# Patient Record
Sex: FEMALE | Race: WHITE | HISPANIC OR LATINO
[De-identification: names, ages, dates, MRNs, and addresses within clinical notes are randomized per-mention and may not be internally consistent; named-entity substitution may affect disease eponyms.]

---

## 2018-04-25 ENCOUNTER — LAB SERVICES (OUTPATIENT)
Dept: OTHER | Age: 38
End: 2018-04-25

## 2018-04-25 ENCOUNTER — CHARTING TRANS (OUTPATIENT)
Dept: OTHER | Age: 38
End: 2018-04-25

## 2018-04-25 LAB — RAPID STREP GROUP A: NORMAL

## 2018-11-01 VITALS
RESPIRATION RATE: 14 BRPM | BODY MASS INDEX: 29.88 KG/M2 | HEIGHT: 64 IN | SYSTOLIC BLOOD PRESSURE: 120 MMHG | WEIGHT: 175 LBS | DIASTOLIC BLOOD PRESSURE: 84 MMHG | TEMPERATURE: 99.2 F | HEART RATE: 70 BPM

## 2019-11-06 ENCOUNTER — WALK IN (OUTPATIENT)
Dept: URGENT CARE | Age: 39
End: 2019-11-06

## 2019-11-06 DIAGNOSIS — J10.1 INFLUENZA A: Primary | ICD-10-CM

## 2019-11-06 PROBLEM — F41.9 ANXIETY: Status: ACTIVE | Noted: 2018-04-25

## 2019-11-06 LAB
FLUAV AG UPPER RESP QL IA.RAPID: POSITIVE
FLUBV AG UPPER RESP QL IA.RAPID: NEGATIVE
INTERNAL PROCEDURAL CONTROLS ACCEPTABLE: YES
S PYO AG THROAT QL IA.RAPID: NEGATIVE

## 2019-11-06 PROCEDURE — 87804 INFLUENZA ASSAY W/OPTIC: CPT | Performed by: NURSE PRACTITIONER

## 2019-11-06 PROCEDURE — 99214 OFFICE O/P EST MOD 30 MIN: CPT | Performed by: NURSE PRACTITIONER

## 2019-11-06 PROCEDURE — 87880 STREP A ASSAY W/OPTIC: CPT | Performed by: NURSE PRACTITIONER

## 2019-11-06 RX ORDER — DULOXETIN HYDROCHLORIDE 20 MG/1
CAPSULE, DELAYED RELEASE ORAL
COMMUNITY
Start: 2019-11-04

## 2019-11-06 RX ORDER — OSELTAMIVIR PHOSPHATE 75 MG/1
75 CAPSULE ORAL 2 TIMES DAILY
Qty: 14 CAPSULE | Refills: 0 | Status: SHIPPED | OUTPATIENT
Start: 2019-11-06 | End: 2019-11-13

## 2019-11-06 ASSESSMENT — ENCOUNTER SYMPTOMS
RHINORRHEA: 1
DIZZINESS: 0
DIAPHORESIS: 0
COUGH: 1
CONSTIPATION: 0
FATIGUE: 1
EYES NEGATIVE: 1
SORE THROAT: 1
WEAKNESS: 0
VOMITING: 0
FEVER: 0
SINUS PRESSURE: 0
HEADACHES: 0
SINUS PAIN: 0
BACK PAIN: 0
NAUSEA: 0
APPETITE CHANGE: 1
ABDOMINAL PAIN: 1
SHORTNESS OF BREATH: 1
CHEST TIGHTNESS: 0
WHEEZING: 0
TROUBLE SWALLOWING: 0
DIARRHEA: 0
CHILLS: 0

## 2019-11-06 ASSESSMENT — PAIN SCALES - GENERAL: PAINLEVEL: 7-8

## 2021-05-26 VITALS
TEMPERATURE: 98.1 F | SYSTOLIC BLOOD PRESSURE: 130 MMHG | BODY MASS INDEX: 31.58 KG/M2 | HEART RATE: 77 BPM | WEIGHT: 185 LBS | HEIGHT: 64 IN | DIASTOLIC BLOOD PRESSURE: 80 MMHG | OXYGEN SATURATION: 97 % | RESPIRATION RATE: 16 BRPM

## 2022-06-23 ENCOUNTER — OFFICE VISIT (OUTPATIENT)
Dept: OBGYN | Age: 42
End: 2022-06-23

## 2022-06-23 VITALS
HEIGHT: 63 IN | HEART RATE: 71 BPM | SYSTOLIC BLOOD PRESSURE: 130 MMHG | TEMPERATURE: 97.2 F | RESPIRATION RATE: 18 BRPM | DIASTOLIC BLOOD PRESSURE: 83 MMHG | WEIGHT: 186.7 LBS | BODY MASS INDEX: 33.08 KG/M2

## 2022-06-23 DIAGNOSIS — D21.9 FIBROID: ICD-10-CM

## 2022-06-23 DIAGNOSIS — N83.209 CYST OF OVARY, UNSPECIFIED LATERALITY: ICD-10-CM

## 2022-06-23 DIAGNOSIS — Z80.9 FAMILY HISTORY OF CANCER: Primary | ICD-10-CM

## 2022-06-23 PROCEDURE — 99203 OFFICE O/P NEW LOW 30 MIN: CPT | Performed by: OBSTETRICS & GYNECOLOGY

## 2022-06-23 ASSESSMENT — PAIN SCALES - GENERAL: PAINLEVEL: 0

## 2022-09-22 ENCOUNTER — OFFICE VISIT (OUTPATIENT)
Dept: MATERNAL FETAL MEDICINE | Age: 42
End: 2022-09-22
Attending: OBSTETRICS & GYNECOLOGY

## 2022-09-22 DIAGNOSIS — N92.1 MENORRHAGIA WITH IRREGULAR CYCLE: Primary | ICD-10-CM

## 2022-09-22 DIAGNOSIS — D25.1 INTRAMURAL LEIOMYOMA OF UTERUS: ICD-10-CM

## 2022-09-22 DIAGNOSIS — N83.202 CYST OF LEFT OVARY: ICD-10-CM

## 2022-09-22 DIAGNOSIS — N92.0 MENORRHAGIA WITH REGULAR CYCLE: ICD-10-CM

## 2022-09-22 PROBLEM — D25.9 UTERINE MYOMA: Status: ACTIVE | Noted: 2022-09-22

## 2022-09-22 PROCEDURE — 76830 TRANSVAGINAL US NON-OB: CPT | Performed by: OBSTETRICS & GYNECOLOGY

## 2022-11-02 ENCOUNTER — TELEPHONE (OUTPATIENT)
Dept: OBGYN | Age: 42
End: 2022-11-02

## 2023-08-15 DIAGNOSIS — K21.9 GASTROESOPHAGEAL REFLUX DISEASE, UNSPECIFIED WHETHER ESOPHAGITIS PRESENT: ICD-10-CM

## 2023-08-15 PROBLEM — R10.33 PERIUMBILICAL ABDOMINAL PAIN: Status: ACTIVE | Noted: 2023-08-15

## 2023-08-15 PROBLEM — J30.9 ALLERGIC RHINITIS: Status: ACTIVE | Noted: 2023-08-15

## 2023-08-15 PROBLEM — M17.12 PRIMARY OSTEOARTHRITIS OF LEFT KNEE: Status: ACTIVE | Noted: 2023-08-15

## 2023-08-15 PROBLEM — R10.31 RIGHT LOWER QUADRANT ABDOMINAL PAIN: Status: ACTIVE | Noted: 2023-08-15

## 2023-08-15 PROBLEM — R50.9 FEVER: Status: ACTIVE | Noted: 2023-08-15

## 2023-08-15 PROBLEM — K59.00 CONSTIPATION: Status: ACTIVE | Noted: 2023-08-15

## 2023-08-15 PROBLEM — E55.9 VITAMIN D DEFICIENCY: Status: ACTIVE | Noted: 2023-08-15

## 2023-08-15 PROBLEM — R13.10 DYSPHAGIA: Status: ACTIVE | Noted: 2023-08-15

## 2023-08-15 PROBLEM — M12.9 ARTHROPATHY: Status: ACTIVE | Noted: 2023-08-15

## 2023-08-15 PROBLEM — R05.9 COUGH: Status: ACTIVE | Noted: 2023-08-15

## 2023-08-15 PROBLEM — R14.0 ABDOMINAL BLOATING: Status: ACTIVE | Noted: 2023-08-15

## 2023-08-15 PROBLEM — R53.83 FATIGUE: Status: ACTIVE | Noted: 2023-08-15

## 2023-08-15 PROBLEM — M25.50 ARTHRALGIA: Status: ACTIVE | Noted: 2023-08-15

## 2023-08-15 PROBLEM — K62.5 RECTAL BLEEDING: Status: ACTIVE | Noted: 2023-08-15

## 2023-08-15 PROBLEM — R50.84 FEBRILE NONHEMOLYTIC TRANSFUSION REACTION: Status: ACTIVE | Noted: 2023-08-15

## 2023-08-15 PROBLEM — E83.10 IRON DISORDER: Status: ACTIVE | Noted: 2023-08-15

## 2023-08-15 PROBLEM — R76.8 POSITIVE ANA (ANTINUCLEAR ANTIBODY): Status: ACTIVE | Noted: 2023-08-15

## 2023-08-15 PROBLEM — J01.90 SINUSITIS, ACUTE: Status: ACTIVE | Noted: 2023-08-15

## 2023-08-15 PROBLEM — J45.909 RAD (REACTIVE AIRWAY DISEASE) (HHS-HCC): Status: ACTIVE | Noted: 2023-08-15

## 2023-08-15 RX ORDER — OMEPRAZOLE 20 MG/1
20 CAPSULE, DELAYED RELEASE ORAL DAILY
COMMUNITY
Start: 2023-07-12

## 2023-08-15 RX ORDER — OMEPRAZOLE 20 MG/1
20 CAPSULE, DELAYED RELEASE ORAL DAILY
Qty: 90 CAPSULE | Refills: 0 | Status: SHIPPED | OUTPATIENT
Start: 2023-08-15

## 2023-08-16 ENCOUNTER — TELEPHONE (OUTPATIENT)
Dept: PRIMARY CARE | Facility: CLINIC | Age: 43
End: 2023-08-16

## 2023-08-16 NOTE — TELEPHONE ENCOUNTER
----- Message from Natalio Gill sent at 8/15/2023  1:48 PM EDT -----  Left voice mail to call back and schedule appt  ----- Message -----  From: Andriy Moseley CMA  Sent: 8/15/2023  12:27 PM EDT  To: Natalio Gill    Due for appointment, last seen a year ago, requesting RF

## 2023-09-05 DIAGNOSIS — Z12.31 ENCOUNTER FOR SCREENING MAMMOGRAM FOR MALIGNANT NEOPLASM OF BREAST: Primary | ICD-10-CM

## 2023-09-06 ENCOUNTER — TELEPHONE (OUTPATIENT)
Dept: PRIMARY CARE | Facility: CLINIC | Age: 43
End: 2023-09-06

## 2023-09-08 ENCOUNTER — TELEPHONE (OUTPATIENT)
Dept: PRIMARY CARE | Facility: CLINIC | Age: 43
End: 2023-09-08

## 2024-07-09 ENCOUNTER — TELEPHONE (OUTPATIENT)
Dept: PRIMARY CARE | Facility: CLINIC | Age: 44
End: 2024-07-09

## 2024-07-09 ENCOUNTER — APPOINTMENT (OUTPATIENT)
Dept: PRIMARY CARE | Facility: CLINIC | Age: 44
End: 2024-07-09
Payer: COMMERCIAL

## 2024-07-09 ENCOUNTER — TELEPHONE (OUTPATIENT)
Dept: SCHEDULING | Age: 44
End: 2024-07-09

## 2024-07-09 ENCOUNTER — LAB (OUTPATIENT)
Dept: LAB | Facility: LAB | Age: 44
End: 2024-07-09
Payer: COMMERCIAL

## 2024-07-09 VITALS
OXYGEN SATURATION: 98 % | SYSTOLIC BLOOD PRESSURE: 120 MMHG | WEIGHT: 220 LBS | HEART RATE: 78 BPM | TEMPERATURE: 98.2 F | BODY MASS INDEX: 40.48 KG/M2 | RESPIRATION RATE: 14 BRPM | HEIGHT: 62 IN | DIASTOLIC BLOOD PRESSURE: 88 MMHG

## 2024-07-09 DIAGNOSIS — E55.9 VITAMIN D DEFICIENCY: ICD-10-CM

## 2024-07-09 DIAGNOSIS — R63.5 WEIGHT GAIN: ICD-10-CM

## 2024-07-09 DIAGNOSIS — Z12.31 ENCOUNTER FOR SCREENING MAMMOGRAM FOR MALIGNANT NEOPLASM OF BREAST: ICD-10-CM

## 2024-07-09 DIAGNOSIS — Z12.11 COLON CANCER SCREENING: ICD-10-CM

## 2024-07-09 DIAGNOSIS — Z84.81 FAMILY HISTORY OF BRCA GENE MUTATION: ICD-10-CM

## 2024-07-09 DIAGNOSIS — J45.902 REACTIVE AIRWAY DISEASE WITH STATUS ASTHMATICUS, UNSPECIFIED ASTHMA SEVERITY, UNSPECIFIED WHETHER PERSISTENT (HHS-HCC): ICD-10-CM

## 2024-07-09 DIAGNOSIS — M25.50 ARTHRALGIA, UNSPECIFIED JOINT: ICD-10-CM

## 2024-07-09 DIAGNOSIS — M12.9 ARTHROPATHY: ICD-10-CM

## 2024-07-09 DIAGNOSIS — Z23 NEED FOR TETANUS BOOSTER: ICD-10-CM

## 2024-07-09 DIAGNOSIS — M25.50 ARTHRALGIA, UNSPECIFIED JOINT: Primary | ICD-10-CM

## 2024-07-09 DIAGNOSIS — K21.00 GASTROESOPHAGEAL REFLUX DISEASE WITH ESOPHAGITIS WITHOUT HEMORRHAGE: ICD-10-CM

## 2024-07-09 PROBLEM — R13.10 DYSPHAGIA: Status: RESOLVED | Noted: 2023-08-15 | Resolved: 2024-07-09

## 2024-07-09 PROBLEM — M17.12 PRIMARY OSTEOARTHRITIS OF LEFT KNEE: Status: RESOLVED | Noted: 2023-08-15 | Resolved: 2024-07-09

## 2024-07-09 PROBLEM — K21.9 GERD (GASTROESOPHAGEAL REFLUX DISEASE): Status: RESOLVED | Noted: 2023-08-15 | Resolved: 2024-07-09

## 2024-07-09 PROBLEM — J45.909 RAD (REACTIVE AIRWAY DISEASE) (HHS-HCC): Status: RESOLVED | Noted: 2023-08-15 | Resolved: 2024-07-09

## 2024-07-09 PROBLEM — R10.33 PERIUMBILICAL ABDOMINAL PAIN: Status: RESOLVED | Noted: 2023-08-15 | Resolved: 2024-07-09

## 2024-07-09 PROBLEM — R50.9 FEVER: Status: RESOLVED | Noted: 2023-08-15 | Resolved: 2024-07-09

## 2024-07-09 PROBLEM — R14.0 ABDOMINAL BLOATING: Status: RESOLVED | Noted: 2023-08-15 | Resolved: 2024-07-09

## 2024-07-09 PROBLEM — R50.84 FEBRILE NONHEMOLYTIC TRANSFUSION REACTION: Status: RESOLVED | Noted: 2023-08-15 | Resolved: 2024-07-09

## 2024-07-09 PROBLEM — R53.83 FATIGUE: Status: RESOLVED | Noted: 2023-08-15 | Resolved: 2024-07-09

## 2024-07-09 PROBLEM — J30.9 ALLERGIC RHINITIS: Status: RESOLVED | Noted: 2023-08-15 | Resolved: 2024-07-09

## 2024-07-09 PROBLEM — K62.5 RECTAL BLEEDING: Status: RESOLVED | Noted: 2023-08-15 | Resolved: 2024-07-09

## 2024-07-09 PROBLEM — J01.90 SINUSITIS, ACUTE: Status: RESOLVED | Noted: 2023-08-15 | Resolved: 2024-07-09

## 2024-07-09 PROBLEM — K59.00 CONSTIPATION: Status: RESOLVED | Noted: 2023-08-15 | Resolved: 2024-07-09

## 2024-07-09 PROBLEM — R05.9 COUGH: Status: RESOLVED | Noted: 2023-08-15 | Resolved: 2024-07-09

## 2024-07-09 PROBLEM — R10.31 RIGHT LOWER QUADRANT ABDOMINAL PAIN: Status: RESOLVED | Noted: 2023-08-15 | Resolved: 2024-07-09

## 2024-07-09 LAB
25(OH)D3 SERPL-MCNC: 50 NG/ML (ref 30–100)
ANION GAP SERPL CALC-SCNC: 13 MMOL/L (ref 10–20)
BUN SERPL-MCNC: 20 MG/DL (ref 6–23)
CALCIUM SERPL-MCNC: 9 MG/DL (ref 8.6–10.3)
CHLORIDE SERPL-SCNC: 105 MMOL/L (ref 98–107)
CO2 SERPL-SCNC: 23 MMOL/L (ref 21–32)
CREAT SERPL-MCNC: 0.82 MG/DL (ref 0.5–1.05)
CRP SERPL-MCNC: 0.13 MG/DL
EGFRCR SERPLBLD CKD-EPI 2021: 89 ML/MIN/1.73M*2
ERYTHROCYTE [DISTWIDTH] IN BLOOD BY AUTOMATED COUNT: 13.7 % (ref 11.5–14.5)
ERYTHROCYTE [SEDIMENTATION RATE] IN BLOOD BY WESTERGREN METHOD: 12 MM/H (ref 0–20)
FERRITIN SERPL-MCNC: 44 NG/ML (ref 8–150)
GLUCOSE SERPL-MCNC: 95 MG/DL (ref 74–99)
HCT VFR BLD AUTO: 40.4 % (ref 36–46)
HGB BLD-MCNC: 13.2 G/DL (ref 12–16)
IRON SATN MFR SERPL: 17 % (ref 25–45)
IRON SERPL-MCNC: 82 UG/DL (ref 35–150)
MCH RBC QN AUTO: 29.7 PG (ref 26–34)
MCHC RBC AUTO-ENTMCNC: 32.7 G/DL (ref 32–36)
MCV RBC AUTO: 91 FL (ref 80–100)
NRBC BLD-RTO: 0 /100 WBCS (ref 0–0)
PLATELET # BLD AUTO: 229 X10*3/UL (ref 150–450)
POTASSIUM SERPL-SCNC: 4.4 MMOL/L (ref 3.5–5.3)
RBC # BLD AUTO: 4.44 X10*6/UL (ref 4–5.2)
SODIUM SERPL-SCNC: 137 MMOL/L (ref 136–145)
TIBC SERPL-MCNC: 493 UG/DL (ref 240–445)
TSH SERPL-ACNC: 2.62 MIU/L (ref 0.44–3.98)
UIBC SERPL-MCNC: 411 UG/DL (ref 110–370)
VIT B12 SERPL-MCNC: 395 PG/ML (ref 211–911)
WBC # BLD AUTO: 9 X10*3/UL (ref 4.4–11.3)

## 2024-07-09 PROCEDURE — 99214 OFFICE O/P EST MOD 30 MIN: CPT | Performed by: INTERNAL MEDICINE

## 2024-07-09 PROCEDURE — 86235 NUCLEAR ANTIGEN ANTIBODY: CPT

## 2024-07-09 PROCEDURE — 83540 ASSAY OF IRON: CPT

## 2024-07-09 PROCEDURE — 80048 BASIC METABOLIC PNL TOTAL CA: CPT

## 2024-07-09 PROCEDURE — 82728 ASSAY OF FERRITIN: CPT

## 2024-07-09 PROCEDURE — 85027 COMPLETE CBC AUTOMATED: CPT

## 2024-07-09 PROCEDURE — 86038 ANTINUCLEAR ANTIBODIES: CPT

## 2024-07-09 PROCEDURE — 86431 RHEUMATOID FACTOR QUANT: CPT

## 2024-07-09 PROCEDURE — 86140 C-REACTIVE PROTEIN: CPT

## 2024-07-09 PROCEDURE — 36415 COLL VENOUS BLD VENIPUNCTURE: CPT

## 2024-07-09 PROCEDURE — 82306 VITAMIN D 25 HYDROXY: CPT

## 2024-07-09 PROCEDURE — 84443 ASSAY THYROID STIM HORMONE: CPT

## 2024-07-09 PROCEDURE — 1036F TOBACCO NON-USER: CPT | Performed by: INTERNAL MEDICINE

## 2024-07-09 PROCEDURE — 83550 IRON BINDING TEST: CPT

## 2024-07-09 PROCEDURE — 82607 VITAMIN B-12: CPT

## 2024-07-09 PROCEDURE — 86200 CCP ANTIBODY: CPT

## 2024-07-09 PROCEDURE — 85652 RBC SED RATE AUTOMATED: CPT

## 2024-07-09 PROCEDURE — 86225 DNA ANTIBODY NATIVE: CPT

## 2024-07-09 RX ORDER — DEXTROAMPHETAMINE SACCHARATE, AMPHETAMINE ASPARTATE, DEXTROAMPHETAMINE SULFATE AND AMPHETAMINE SULFATE 3.75; 3.75; 3.75; 3.75 MG/1; MG/1; MG/1; MG/1
15 TABLET ORAL 2 TIMES DAILY
COMMUNITY
Start: 2024-07-02 | End: 2024-09-30

## 2024-07-09 RX ORDER — ACETAMINOPHEN 500 MG
1 TABLET ORAL DAILY
COMMUNITY
Start: 2022-03-29

## 2024-07-09 RX ORDER — NORETHINDRONE ACETATE AND ETHINYL ESTRADIOL, AND FERROUS FUMARATE 1MG-20(24)
1 KIT ORAL DAILY
COMMUNITY

## 2024-07-09 ASSESSMENT — PATIENT HEALTH QUESTIONNAIRE - PHQ9
1. LITTLE INTEREST OR PLEASURE IN DOING THINGS: NOT AT ALL
SUM OF ALL RESPONSES TO PHQ9 QUESTIONS 1 AND 2: 0
2. FEELING DOWN, DEPRESSED OR HOPELESS: NOT AT ALL

## 2024-07-09 NOTE — PROGRESS NOTES
"Subjective    Mar Nazario is a 44 y.o. female who presents for Joint Pain.  HPI    C/o joint pain throughout body but hands are the worst.  Decreased range of motion in thumbs  Hands back,hip knees,   Has had joint pain for years but worsening over the last 3 months  She has a rash on her face. She has had for a few years.   She has been on birth control.      Sister diagnosed with breast cancer her was dx at 40 with BRCA gene       Review of Systems   All other systems reviewed and are negative.        Objective     /88 (BP Location: Left arm, Patient Position: Sitting, BP Cuff Size: Adult)   Pulse 78   Temp 36.8 °C (98.2 °F) (Skin)   Resp 14   Ht 1.575 m (5' 2\")   Wt 99.8 kg (220 lb)   SpO2 98%   BMI 40.24 kg/m²    Physical Exam  Vitals reviewed.   Constitutional:       General: She is not in acute distress.     Appearance: Normal appearance.   Cardiovascular:      Rate and Rhythm: Normal rate and regular rhythm.      Pulses: Normal pulses.      Heart sounds: Normal heart sounds.   Pulmonary:      Effort: Pulmonary effort is normal.      Breath sounds: Normal breath sounds.   Abdominal:      Tenderness: There is no abdominal tenderness.   Musculoskeletal:         General: Swelling present.   Skin:     General: Skin is warm and dry.   Neurological:      Mental Status: She is alert.       Health Maintenance Due   Topic Date Due    Yearly Adult Physical  Never done    Lipid Panel  Never done    HIV Screening  Never done    MMR Vaccines (1 of 1 - Standard series) Never done    Hepatitis C Screening  Never done    Diabetes Screening  Never done    Hepatitis B Vaccines (1 of 3 - 19+ 3-dose series) Never done    DTaP/Tdap/Td Vaccines (1 - Tdap) Never done    Cervical Cancer Screening  06/25/2024    Mammogram  09/08/2024          Assessment/Plan   Problem List Items Addressed This Visit       Arthralgia - Primary    Relevant Orders    Basic Metabolic Panel    Vitamin B12    Iron and TIBC    Ferritin "    CBC    KATI with Reflex to DIANA    Citrulline Antibody, IgG    C-Reactive Protein    Rheumatoid Factor    Sedimentation Rate    Arthropathy    RESOLVED: GERD (gastroesophageal reflux disease)    RESOLVED: RAD (reactive airway disease) ([facility])    Vitamin D deficiency    Relevant Orders    Vitamin D 25-Hydroxy,Total (for eval of Vitamin D levels)     Other Visit Diagnoses       Family history of BRCA gene mutation        Relevant Orders    Referral to Genetics    Colon cancer screening        Relevant Orders    Colonoscopy Screening; Average Risk Patient    Need for tetanus booster        Weight gain        Relevant Orders    TSH with reflex to Free T4 if abnormal    Encounter for screening mammogram for malignant neoplasm of breast        Relevant Orders    BI mammo bilateral screening tomosynthesis        She has sx suspicious of rheumatologic. She was unable to get to rheum last time due to Covid.  Will repeat labs.   Her sister has BRCA positive breast cancer. Refer to genetics. Check mamm  Due for colonoscopy ordered she will get outside the system as she lives near [place]  Due for tetanus but pt left before it could be given  Call  with any problems or questions.   Follow up in  a year or sooner if needed

## 2024-07-09 NOTE — TELEPHONE ENCOUNTER
Pt looked at her insurance information/website and believes she qualifies for [employer] due to a pre-existing condition. She would like the order to be submitted.

## 2024-07-10 DIAGNOSIS — E66.01 MORBID OBESITY WITH BODY MASS INDEX (BMI) OF 40.0 TO 44.9 IN ADULT (MULTI): Primary | ICD-10-CM

## 2024-07-10 DIAGNOSIS — K21.9 GASTROESOPHAGEAL REFLUX DISEASE, UNSPECIFIED WHETHER ESOPHAGITIS PRESENT: ICD-10-CM

## 2024-07-10 LAB
CCP IGG SERPL-ACNC: <1 U/ML
RHEUMATOID FACT SER NEPH-ACNC: <10 IU/ML (ref 0–15)

## 2024-07-10 RX ORDER — OMEPRAZOLE 20 MG/1
20 CAPSULE, DELAYED RELEASE ORAL DAILY
Qty: 90 CAPSULE | Refills: 3 | Status: SHIPPED | OUTPATIENT
Start: 2024-07-10

## 2024-07-10 RX ORDER — SEMAGLUTIDE 0.25 MG/.5ML
0.25 INJECTION, SOLUTION SUBCUTANEOUS
Qty: 2 ML | Refills: 0 | Status: SHIPPED | OUTPATIENT
Start: 2024-07-14 | End: 2024-08-05

## 2024-07-10 NOTE — RESULT ENCOUNTER NOTE
Her iron stores are a little low but otherwise her labs are good. Her annetta is pending.  Recommend either increasing her iron rich foods or taking otc iron supplement 3 times a week  Will get back to her with her annetta may take a week

## 2024-07-11 ENCOUNTER — TELEPHONE (OUTPATIENT)
Dept: PRIMARY CARE | Facility: CLINIC | Age: 44
End: 2024-07-11
Payer: COMMERCIAL

## 2024-07-11 LAB
ANA PATTERN: ABNORMAL
ANA SER QL HEP2 SUBST: POSITIVE
ANA TITR SER IF: ABNORMAL {TITER}
CENTROMERE B AB SER-ACNC: <0.2 AI
CHROMATIN AB SERPL-ACNC: <0.2 AI
DSDNA AB SER-ACNC: <1 IU/ML
ENA JO1 AB SER QL IA: <0.2 AI
ENA RNP AB SER IA-ACNC: 0.4 AI
ENA SCL70 AB SER QL IA: <0.2 AI
ENA SM AB SER IA-ACNC: <0.2 AI
ENA SM+RNP AB SER QL IA: <0.2 AI
ENA SS-A AB SER IA-ACNC: <0.2 AI
ENA SS-B AB SER IA-ACNC: <0.2 AI
RIBOSOMAL P AB SER-ACNC: <0.2 AI

## 2024-07-11 NOTE — TELEPHONE ENCOUNTER
----- Message from Verena Boone sent at 7/10/2024  5:23 PM EDT -----  Her iron stores are a little low but otherwise her labs are good. Her annetta is pending.  Recommend either increasing her iron rich foods or taking otc iron supplement 3 times a week  Will get back to her with her annetta may take a week

## 2024-07-11 NOTE — RESULT ENCOUNTER NOTE
Her annetta is again positive with her sx would recommend she see rheum. I will put referral in .   Can she take she ibuprofen? Had inflamation on her egd

## 2024-07-12 ENCOUNTER — TELEPHONE (OUTPATIENT)
Dept: PRIMARY CARE | Facility: CLINIC | Age: 44
End: 2024-07-12
Payer: COMMERCIAL

## 2024-07-12 ENCOUNTER — TELEPHONE (OUTPATIENT)
Dept: SCHEDULING | Age: 44
End: 2024-07-12
Payer: COMMERCIAL

## 2024-07-12 DIAGNOSIS — R76.8 POSITIVE ANA (ANTINUCLEAR ANTIBODY): ICD-10-CM

## 2024-07-12 NOTE — TELEPHONE ENCOUNTER
----- Message from Verena Boone sent at 7/11/2024  4:50 PM EDT -----  Her annetta is again positive with her sx would recommend she see rheum. I will put referral in .   Can she take she ibuprofen? Had inflamation on her egd

## 2024-08-22 DIAGNOSIS — Z12.11 COLON CANCER SCREENING: ICD-10-CM

## 2024-08-22 RX ORDER — POLYETHYLENE GLYCOL 3350, SODIUM SULFATE ANHYDROUS, SODIUM BICARBONATE, SODIUM CHLORIDE, POTASSIUM CHLORIDE 236; 22.74; 6.74; 5.86; 2.97 G/4L; G/4L; G/4L; G/4L; G/4L
POWDER, FOR SOLUTION ORAL
Qty: 4000 ML | Refills: 0 | Status: SHIPPED | OUTPATIENT
Start: 2024-08-22

## 2024-09-06 ENCOUNTER — APPOINTMENT (OUTPATIENT)
Dept: GASTROENTEROLOGY | Facility: EXTERNAL LOCATION | Age: 44
End: 2024-09-06
Payer: COMMERCIAL

## 2024-09-09 ENCOUNTER — HOSPITAL ENCOUNTER (OUTPATIENT)
Dept: RADIOLOGY | Facility: CLINIC | Age: 44
Discharge: HOME | End: 2024-09-09
Payer: COMMERCIAL

## 2024-09-09 VITALS — BODY MASS INDEX: 38.64 KG/M2 | HEIGHT: 62 IN | WEIGHT: 210 LBS

## 2024-09-09 DIAGNOSIS — Z12.31 ENCOUNTER FOR SCREENING MAMMOGRAM FOR MALIGNANT NEOPLASM OF BREAST: ICD-10-CM

## 2024-09-09 PROCEDURE — 77067 SCR MAMMO BI INCL CAD: CPT

## 2024-09-09 PROCEDURE — 77063 BREAST TOMOSYNTHESIS BI: CPT

## 2024-09-24 ENCOUNTER — APPOINTMENT (OUTPATIENT)
Dept: RHEUMATOLOGY | Facility: CLINIC | Age: 44
End: 2024-09-24
Payer: COMMERCIAL

## 2024-09-24 VITALS
WEIGHT: 215 LBS | HEART RATE: 85 BPM | HEIGHT: 62 IN | DIASTOLIC BLOOD PRESSURE: 76 MMHG | BODY MASS INDEX: 39.56 KG/M2 | OXYGEN SATURATION: 96 % | SYSTOLIC BLOOD PRESSURE: 119 MMHG

## 2024-09-24 DIAGNOSIS — R76.8 POSITIVE ANA (ANTINUCLEAR ANTIBODY): ICD-10-CM

## 2024-09-24 PROCEDURE — 3008F BODY MASS INDEX DOCD: CPT | Performed by: INTERNAL MEDICINE

## 2024-09-24 PROCEDURE — 1036F TOBACCO NON-USER: CPT | Performed by: INTERNAL MEDICINE

## 2024-09-24 PROCEDURE — 99204 OFFICE O/P NEW MOD 45 MIN: CPT | Performed by: INTERNAL MEDICINE

## 2024-09-24 NOTE — LETTER
"September 24, 2024     Verena Boone DO  [id number]  [facility] [place] [address]    Patient: Mar Nazario   YOB: 1980   Date of Visit: 9/24/2024       Dear Dr. Verena Boone DO:    Thank you for referring Mar Nazario to me for evaluation. Below are my notes for this consultation.  If you have questions, please do not hesitate to call me. I look forward to following your patient along with you.       Sincerely,     Yury Jacobs MD      CC: No Recipients  ______________________________________________________________________________________    Subjective . Mar Nazario is a 44 y.o. female who presents for New Patient Visit (NPV- Positive KATI).    HPI.  44-year-old female with history of ADHD, migraine headaches, GERD, diverticulitis, obesity and knee OA s/p left TKR referred by primary healthcare provider in consultation for positive KATI.    -She has chronic facial rash involving the nose and cheeks that sometimes becomes brighter.  No changes with sunlight.  No itching or pain.  -She reports locking of the thumbs, right more than left.  Sometimes feels swelling of the thumb joints.  -Reports less restless legs at night, started 3-4 months ago.  -Feet itching started about 1 month ago.  There is no rash on the feet.  -Sometimes toe turned blue.  -Reports chronic brain fog.  -Right-sided migraine is not too bad.  She states it is controlled with diet modifications.  -Intermittent blurry vision for the past 6 months.  No redness, excessive tearing, dryness or photophobia.    Social history: She is .  She socially drinks.  She is a .  Family history: Mother has breast cancer and diabetes.  Father had diabetes.  Surgical history: Left TKR.(November, 2023)    Review of Systems   All other systems reviewed and are negative.    Objective    Blood pressure 119/76, pulse 85, height 1.575 m (5' 2\"), weight 97.5 kg (215 lb), SpO2 96%.    Physical Exam.  Gen. AAO " x3, NAD.  HEENT: No pallor or icterus, PERRLA, EOMI. Oropharynx is clear. MM moist,Parotid glands  not enlarged. No cervical lymphadenopathy .  Skin: No rashes.  Heart: S1, S2/ RRR. No murmurs or gallops.  Lungs: CTA B.  Abdomen: Soft, NT/ND, BS regular.  MSK: No.swelling or tenderness of the  upper or lower extremity joints with full ROM, Neck,spine and Haseeb SI with out tenderness.  Neuro: CN II-XII intact. Sensation to touch intact.Strength 5/5 throughout. DTR 2+ and symmetrical.  Psych:Appropriate mood and behavior  EXT: No edema    Assessment/Plan . 44-year-old female with history of ADHD, migraine headaches, GERD, diverticulitis, obesity and knee OA s/p left TKR presented with myriad of symptoms as aforementioned.  Blood work obtained about 2 months ago showed positive KATI at 1: 60 with negative DIANA.  RF, anti-CCP negative.  Inflammatory markers within normal limits.  Of note she has had positive KATI in 2020 with negative DIANA.  Complements were within normal limits.      Discussed with patient that she has positive KATI however there is no stigmata of autoimmune connective tissue disease.  No evidence of inflammatory arthritis.    Facial redness could be from rosacea.  Patient was advised to follow-up with primary healthcare provider.     Patient was asked to follow-up with rheumatology as needed.     This note was partially generated using the Dragon Voice recognition system. There may be some incorrect wording, spelling and/or spelling errors or punctuation errors that were not corrected prior to committing the note to the medical record.          Problem List Items Addressed This Visit       Positive KATI (antinuclear antibody)            Active Ambulatory Problems     Diagnosis Date Noted   • Arthralgia 08/15/2023   • Arthropathy 08/15/2023   • Iron disorder 08/15/2023   • Positive KATI (antinuclear antibody) 08/15/2023   • Vitamin D deficiency 08/15/2023     Resolved Ambulatory Problems     Diagnosis Date  Noted   • Abdominal bloating 08/15/2023   • Constipation 08/15/2023   • Allergic rhinitis 08/15/2023   • Cough 08/15/2023   • Dysphagia 08/15/2023   • Fatigue 08/15/2023   • Febrile nonhemolytic transfusion reaction 08/15/2023   • Fever 08/15/2023   • GERD (gastroesophageal reflux disease) 08/15/2023   • Periumbilical abdominal pain 08/15/2023   • Primary osteoarthritis of left knee 08/15/2023   • RAD (reactive airway disease) ([facility]) 08/15/2023   • Rectal bleeding 08/15/2023   • Right lower quadrant abdominal pain 08/15/2023   • Sinusitis, acute 08/15/2023     Past Medical History:   Diagnosis Date   • ADHD (attention deficit hyperactivity disorder) 1 year ago   • Anxiety 1 year ago   • Arthritis    • Bursitis of hip 2018   • Low back pain 2018   • Personal history of other benign neoplasm    • Personal history of other diseases of the nervous system and sense organs    • Plantar fasciitis 2006   • Sciatica 2018   • Trigger finger 2024       Family History   Problem Relation Name Age of Onset   • Arthritis Mother Verenice    • Breast cancer Mother Verenice    • Cancer Mother Verenice    • Mental illness Mother Verenice    • Vision loss Mother Verenice    • Depression Mother Verenice    • Diabetes Mother Verenice    • Alcohol abuse Father Edyg    • Diabetes Father Edyg    • Drug abuse Father Edyg    • Arthritis Maternal Grandfather Rachid    • Diabetes Maternal Grandfather Rachid    • Cancer Maternal Grandmother Hipolita    • Ovarian cancer Maternal Grandmother Hipolita    • Heart disease Paternal Grandfather Cecilio    • Hypertension Paternal Grandfather Cecilio    • Stroke Paternal Grandfather Cecilio    • Arthritis Sister Geovanna    • Breast cancer Sister Geovanna    • Cancer Sister Geovanna    • Genetic Testing Sister Geovanna    • Depression Sister Geovanna    • Autoimmune disease Father's Sister Marita Nazario    • Lupus Father's Sister Marita Nazario        Past Surgical History:   Procedure Laterality Date   • JOINT  REPLACEMENT  11/16/23 left knee replaced   • KNEE ARTHROPLASTY  2023   • KNEE SURGERY  04/07/2014    Knee Surgery   • OTHER SURGICAL HISTORY  04/07/2014    Uterine Fibroid Embolization       Social History     Tobacco Use   Smoking Status Never   Smokeless Tobacco Never       Allergies  Oxycodone-acetaminophen and Codeine    Current Meds  Current Outpatient Medications   Medication Instructions   • amphetamine-dextroamphetamine (Adderall) 15 mg tablet 15 mg, oral, 2 times daily   • cholecalciferol (Vitamin D-3) 5,000 Units tablet 1 tablet, oral, Daily   • Holly 24 Fe 1 mg-20 mcg (24)/75 mg (4) tablet 1 tablet, oral, Daily   • omeprazole (PRILOSEC) 20 mg, oral, Daily   • omeprazole (PRILOSEC) 20 mg, oral, Daily   • polyethylene glycol (GoLYTELY) 236-22.74-6.74 -5.86 gram solution Drink 1/2 starting at 6 pm the night before your procedure then drink the 2nd 1/2 5 hours before procedure arrival time        Lab Results   Component Value Date    ANATITER 1:160 07/09/2024    RF <10 07/09/2024    SEDRATE 12 07/09/2024    CRP 0.13 07/09/2024    URICACID 3.9 04/28/2020    DNADS <1.0 07/09/2024        Procedures     Yury Jacobs MD

## 2024-09-24 NOTE — PROGRESS NOTES
"Subjective  . Mar Nazario is a 44 y.o. female who presents for New Patient Visit (NPV- Positive KATI).    HPI.  44-year-old female with history of ADHD, migraine headaches, GERD, diverticulitis, obesity and knee OA s/p left TKR referred by primary healthcare provider in consultation for positive KATI.    -She has chronic facial rash involving the nose and cheeks that sometimes becomes brighter.  No changes with sunlight.  No itching or pain.  -She reports locking of the thumbs, right more than left.  Sometimes feels swelling of the thumb joints.  -Reports less restless legs at night, started 3-4 months ago.  -Feet itching started about 1 month ago.  There is no rash on the feet.  -Sometimes toe turned blue.  -Reports chronic brain fog.  -Right-sided migraine is not too bad.  She states it is controlled with diet modifications.  -Intermittent blurry vision for the past 6 months.  No redness, excessive tearing, dryness or photophobia.    Social history: She is .  She socially drinks.  She is a .  Family history: Mother has breast cancer and diabetes.  Father had diabetes.  Surgical history: Left TKR.(November, 2023)    Review of Systems   All other systems reviewed and are negative.    Objective     Blood pressure 119/76, pulse 85, height 1.575 m (5' 2\"), weight 97.5 kg (215 lb), SpO2 96%.    Physical Exam.  Gen. AAO x3, NAD.  HEENT: No pallor or icterus, PERRLA, EOMI. Oropharynx is clear. MM moist,Parotid glands  not enlarged. No cervical lymphadenopathy .  Skin: No rashes.  Heart: S1, S2/ RRR. No murmurs or gallops.  Lungs: CTA B.  Abdomen: Soft, NT/ND, BS regular.  MSK: No.swelling or tenderness of the  upper or lower extremity joints with full ROM, Neck,spine and Haseeb SI with out tenderness.  Neuro: CN II-XII intact. Sensation to touch intact.Strength 5/5 throughout. DTR 2+ and symmetrical.  Psych:Appropriate mood and behavior  EXT: No edema    Assessment/Plan  . 44-year-old female with " history of ADHD, migraine headaches, GERD, diverticulitis, obesity and knee OA s/p left TKR presented with myriad of symptoms as aforementioned.  Blood work obtained about 2 months ago showed positive KATI at 1: 60 with negative DIANA.  RF, anti-CCP negative.  Inflammatory markers within normal limits.  Of note she has had positive KATI in 2020 with negative DIANA.  Complements were within normal limits.      Discussed with patient that she has positive KATI however there is no stigmata of autoimmune connective tissue disease.  No evidence of inflammatory arthritis.    Facial redness could be from rosacea.  Patient was advised to follow-up with primary healthcare provider.     Patient was asked to follow-up with rheumatology as needed.     This note was partially generated using the Dragon Voice recognition system. There may be some incorrect wording, spelling and/or spelling errors or punctuation errors that were not corrected prior to committing the note to the medical record.          Problem List Items Addressed This Visit       Positive KATI (antinuclear antibody)            Active Ambulatory Problems     Diagnosis Date Noted    Arthralgia 08/15/2023    Arthropathy 08/15/2023    Iron disorder 08/15/2023    Positive KATI (antinuclear antibody) 08/15/2023    Vitamin D deficiency 08/15/2023     Resolved Ambulatory Problems     Diagnosis Date Noted    Abdominal bloating 08/15/2023    Constipation 08/15/2023    Allergic rhinitis 08/15/2023    Cough 08/15/2023    Dysphagia 08/15/2023    Fatigue 08/15/2023    Febrile nonhemolytic transfusion reaction 08/15/2023    Fever 08/15/2023    GERD (gastroesophageal reflux disease) 08/15/2023    Periumbilical abdominal pain 08/15/2023    Primary osteoarthritis of left knee 08/15/2023    RAD (reactive airway disease) ([facility]) 08/15/2023    Rectal bleeding 08/15/2023    Right lower quadrant abdominal pain 08/15/2023    Sinusitis, acute 08/15/2023     Past Medical History:   Diagnosis  Date    ADHD (attention deficit hyperactivity disorder) 1 year ago    Anxiety 1 year ago    Arthritis     Bursitis of hip 2018    Low back pain 2018    Personal history of other benign neoplasm     Personal history of other diseases of the nervous system and sense organs     Plantar fasciitis 2006    Sciatica 2018    Trigger finger 2024       Family History   Problem Relation Name Age of Onset    Arthritis Mother Verenice     Breast cancer Mother Verenice     Cancer Mother Verenice     Mental illness Mother Verenice     Vision loss Mother Verenice     Depression Mother Verenice     Diabetes Mother Verenice     Alcohol abuse Father Edward     Diabetes Father Edgy     Drug abuse Father Edward     Arthritis Maternal Grandfather Rachid     Diabetes Maternal Grandfather Rachid     Cancer Maternal Grandmother Hipolita     Ovarian cancer Maternal Grandmother Hipolita     Heart disease Paternal Grandfather Cecilio     Hypertension Paternal Grandfather Cecilio     Stroke Paternal Grandfather Cecilio     Arthritis Sister Geovanna     Breast cancer Sister Geovanna     Cancer Sister Geovanna     Genetic Testing Sister Geovanna     Depression Sister Geovanna     Autoimmune disease Father's Sister Marita Nazario     Lupus Father's Sister Marita Nazario        Past Surgical History:   Procedure Laterality Date    JOINT REPLACEMENT  11/16/23 left knee replaced    KNEE ARTHROPLASTY  2023    KNEE SURGERY  04/07/2014    Knee Surgery    OTHER SURGICAL HISTORY  04/07/2014    Uterine Fibroid Embolization       Social History     Tobacco Use   Smoking Status Never   Smokeless Tobacco Never       Allergies  Oxycodone-acetaminophen and Codeine    Current Meds  Current Outpatient Medications   Medication Instructions    amphetamine-dextroamphetamine (Adderall) 15 mg tablet 15 mg, oral, 2 times daily    cholecalciferol (Vitamin D-3) 5,000 Units tablet 1 tablet, oral, Daily    Holly 24 Fe 1 mg-20 mcg (24)/75 mg (4) tablet 1 tablet, oral, Daily    omeprazole  (PRILOSEC) 20 mg, oral, Daily    omeprazole (PRILOSEC) 20 mg, oral, Daily    polyethylene glycol (GoLYTELY) 236-22.74-6.74 -5.86 gram solution Drink 1/2 starting at 6 pm the night before your procedure then drink the 2nd 1/2 5 hours before procedure arrival time        Lab Results   Component Value Date    ANATITER 1:160 07/09/2024    RF <10 07/09/2024    SEDRATE 12 07/09/2024    CRP 0.13 07/09/2024    URICACID 3.9 04/28/2020    DNADS <1.0 07/09/2024        Procedures     Yury Jacobs MD

## 2024-09-24 NOTE — PATIENT INSTRUCTIONS
You have positive KATI however there is no evidence of autoimmune connective tissue disease or inflammatory arthritis.  Follow-up with rheumatology as needed.

## 2024-11-19 ENCOUNTER — OFFICE VISIT (OUTPATIENT)
Dept: URGENT CARE | Age: 44
End: 2024-11-19
Payer: COMMERCIAL

## 2024-11-19 VITALS
RESPIRATION RATE: 16 BRPM | TEMPERATURE: 98.6 F | SYSTOLIC BLOOD PRESSURE: 113 MMHG | DIASTOLIC BLOOD PRESSURE: 79 MMHG | OXYGEN SATURATION: 97 % | HEART RATE: 85 BPM

## 2024-11-19 DIAGNOSIS — H65.91 MUCOID OTITIS MEDIA OF RIGHT EAR, UNSPECIFIED CHRONICITY: Primary | ICD-10-CM

## 2024-11-19 PROCEDURE — 99203 OFFICE O/P NEW LOW 30 MIN: CPT

## 2024-11-19 PROCEDURE — 1036F TOBACCO NON-USER: CPT

## 2024-11-19 RX ORDER — AMOXICILLIN AND CLAVULANATE POTASSIUM 875; 125 MG/1; MG/1
1 TABLET, FILM COATED ORAL 2 TIMES DAILY
Qty: 20 TABLET | Refills: 0 | Status: SHIPPED | OUTPATIENT
Start: 2024-11-19 | End: 2024-11-29

## 2024-11-19 NOTE — PROGRESS NOTES
Subjective   Patient ID: Mar Nazario is a 44 y.o. female. They present today with a chief complaint of Cough (Congestion, ST, sinus pressure).    History of Present Illness  Jennifer Nazario is a 44 y.o. female. They present today with a chief complaint of Cough (Congestion, ST, sinus pressure).  The patient reports having the symptoms over the last 2 days.  She has only used over-the-counter Mucinex and Tylenol.  She is here for further evaluation and health maintenance.     Past Medical History  Allergies as of 11/19/2024 - Reviewed 11/19/2024   Allergen Reaction Noted    Oxycodone-acetaminophen Other 12/03/2008    Codeine Other and Palpitations 11/15/2011       (Not in a hospital admission)       Past Medical History:   Diagnosis Date    ADHD (attention deficit hyperactivity disorder) 1 year ago    Anxiety 1 year ago    Arthritis     Bursitis of hip 2018    GERD (gastroesophageal reflux disease)     Low back pain 2018    Personal history of other benign neoplasm     History of uterine leiomyoma    Personal history of other diseases of the nervous system and sense organs     History of migraine    Plantar fasciitis 2006    Primary osteoarthritis of left knee 08/15/2023    Sciatica 2018    Trigger finger 2024       Past Surgical History:   Procedure Laterality Date    JOINT REPLACEMENT  11/16/23 left knee replaced    KNEE ARTHROPLASTY  2023    KNEE SURGERY  04/07/2014    Knee Surgery    OTHER SURGICAL HISTORY  04/07/2014    Uterine Fibroid Embolization        reports that she has never smoked. She has never used smokeless tobacco. She reports that she does not currently use alcohol. She reports that she does not use drugs.    Review of Systems  Review of Systems   Fatigue, body aches, chills, ear pain, sinus pressure, nasal drainage, cough, wheezing    Objective    Vitals:    11/19/24 0832   BP: 113/79   Pulse: 85   Resp: 16   Temp: 37 °C (98.6 °F)   SpO2: 97%     No LMP recorded.    Physical  Exam  Erythematous TM, swelling, tenderness to the inner ear canal of the right ear  Erythematous pharynx +1 tonsils bilaterally no exudates  Diminished lung sounds  Procedures    Point of Care Test & Imaging Results from this visit  No results found for this visit on 11/19/24.   No results found.    Diagnostic study results (if any) were reviewed by LINDA Vasquez.    Assessment/Plan   Allergies, medications, history, and pertinent labs/EKGs/Imaging reviewed by LINDA Vasquez.     Medical Decision Making  Upon initial assessment, physical examination does reveal otitis media of the right ear as evidenced by an erythematous TM, swelling and tenderness to the inner ear canal.  Left ear is unremarkable.  Mouth and throat exam does reveal an erythematous pharynx with +1 tonsils bilaterally and no exudates.  Diminished lung sounds as well.  Given her symptoms, I sent over Augmentin 1 tablet twice a day for 10 days.  Over-the-counter medications as discussed.  Follow-up with primary care provider.    As a result of the work-up, the patient was discharged home.  she was informed of her diagnosis and instructed to come back with any concerns or worsening of condition.  she and was agreeable to the plan as discussed above.  she was given the opportunity to ask questions.  All of the patient's questions were answered.    This document was generated using the assistance of voice recognition software. If there are any errors of spelling, grammar, syntax, or meaning; please feel free to contact me directly for clarification.     Orders and Diagnoses  Diagnoses and all orders for this visit:  Mucoid otitis media of right ear, unspecified chronicity  -     amoxicillin-pot clavulanate (Augmentin) 875-125 mg tablet; Take 1 tablet by mouth 2 times a day for 10 days.      Medical Admin Record      Patient disposition: Home    Electronically signed by LINDA Vasquez  8:42 AM

## 2024-11-22 ENCOUNTER — TELEMEDICINE CLINICAL SUPPORT (OUTPATIENT)
Dept: GENETICS | Facility: HOSPITAL | Age: 44
End: 2024-11-22
Payer: COMMERCIAL

## 2024-11-22 DIAGNOSIS — Z80.0 FAMILY HISTORY OF COLON CANCER: ICD-10-CM

## 2024-11-22 DIAGNOSIS — Z80.3 FAMILY HISTORY OF BREAST CANCER: ICD-10-CM

## 2024-11-22 DIAGNOSIS — Z71.83 ENCOUNTER FOR NONPROCREATIVE GENETIC COUNSELING AND TESTING: Primary | ICD-10-CM

## 2024-11-22 DIAGNOSIS — Z13.71 ENCOUNTER FOR NONPROCREATIVE GENETIC COUNSELING AND TESTING: Primary | ICD-10-CM

## 2024-11-22 PROCEDURE — 96040 HC GENETIC COUNSELING, EACH 30 MIN: CPT | Mod: 95 | Performed by: GENETIC COUNSELOR, MS

## 2024-11-22 PROCEDURE — 96040 PR MEDICAL GENETICS COUNSELING EACH 30 MINUTES: CPT | Performed by: GENETIC COUNSELOR, MS

## 2024-11-22 NOTE — PROGRESS NOTES
History of Present Illness:  Mar Nazario is a 44 y.o. female with a family history of breast and colon cancer.  She was referred to the Cancer Genetics Clinic at [facility] by Dr. Verena Boone (PCP).  Ms. Nazario is interested in genetic testing to clarify her personal risk for cancer.    Cancer Medical History:  Personal history of cancer? No     Prior genetic testing? No     Cancer screening history:  Mammograms? Annual. Most recent in 2024. Has not had a breast MRI.   Patient denies personal history of breast biopsy.   PAP smear? Most recent in .     Colonoscopy? Yes x 1.  One polyp.  Has order for her next colonoscopy (was due this year, but this had to be rescheduled)  Upper endoscopy? Yes in , hiatal hernia.   Dermatology? Skin lesion removed from her forehead, that was benign.   Other cancer screening? No     Reproductive/GYN History:  Number of children: 0  Number of pregnancies: 0  Menarche (age): 9  Menopause (age): n/a  OCP: Yes , 2 years   HRT: No     Hysterectomy? No   Oophorectomy? No   Uterine fibroids removed in ~.     Family history:  A 4-generation pedigree was obtained and was significant for the following:   - Sister, breast cancer at 39 or 40, negative genetic testing through [facility] in , (70-gene Multi-Cancer panel, results confirmed)  - Mother, breast cancer at 63  - Maternal aunt, colon cancer at 19, hemangioma behind the eye, tumor of the left nasal cavity  - Maternal aunt, cancer NOS  - Maternal grandmother, colon cancer,  of a stroke in her 40s/50s  - Two maternal great aunts (MGM's side) had colon cancer at uncertain ages,   - Maternal great grandmother (MGM's side) with possible cancer history,   - Maternal great grandfather (MGM's side) with colon cancer at uncertain age,   - Two maternal great aunts (MGF's side) had breast cancer at uncertain ages,     Limited information about her paternal family  history's medical history, but no known cancer.      Maternal and paternal ancestry is Turkish.  There is no known Ashkenazi Confucianist ancestry or consanguinity.      Genetic Counseling:  Mar Nazario is a 44 y.o. female with a strong maternal family history of breast and colon cancer, concerning for possible hereditary cancer. Her sister, who had breast cancer at 39 or 40, had negative genetic testing (results confirmed).  We discussed that this decreases the likelihood of a single gene mutation in the family, but does not rule out the possibility.  We discussed that there could be a gene mutation in the family that her sister did not inherit.  The family history of breast cancer could be BRCA1 or BRCA2-related hereditary breast and ovarian cancer (HBOC), or hereditary breast cancer due to a mutation in a different gene, such as PALB2.  The family history of colon cancer could be due to a hereditary cause as well, such as Prado syndrome.   - Based on her family history of breast cancer in her mother and two maternal great aunts, Ms. Nazario meets current national (NCCN) criteria for testing of high-penetrance breast cancer susceptibility genes, including BRCA1, BRCA2, CDH1, PALB2, PTEN, and TP53.    - Based on her family history of colon cancer in a maternal aunt, maternal grandmother, and other maternal relatives, Ms. Nazario meets current national (NCCN) criteria for testing of genes associated with Prado syndrome.   Testing is medically necessary, as it will help determine if Ms. Nazario is a candidate for extra cancer screenings or risk-reducing options.     We reviewed genes and the concept of hereditary cancer.  We discussed that most cancers are not due to an inherited genetic susceptibility.  However, in about 5-10% of cases of cancer, there is an inherited genetic mutation that can make a person more susceptible to developing certain forms of cancer.  Within these families, we often see  "multiple family members with cancer, occurring in multiple generations.  In addition, earlier onset and bilateral cancers can be suggestive of hereditary cancer.  Finally, there is typically a clustering of certain types of cancer in these families, such as breast and ovarian cancer.    For example, we discussed the BRCA1 and BRCA2 genes, which are two genes that have been linked to hereditary breast and ovarian cancer. Mutations in these genes are inherited in an autosomal dominant pattern and confer an estimated 60-80% lifetime risk for breast cancer in women. This is elevated compared to the general population risk of 10-12%. In addition, female BRCA1/2 mutation carriers have up to a 45% lifetime risk for ovarian cancer, which is elevated over the 1-2% general population risk. Mutation carriers who have already been diagnosed with breast cancer have an increased risk to develop a second primary breast cancer. Mutations in BRCA1 and BRCA2 have also been associated with an increased risk for male breast cancer, prostate cancer, and pancreatic cancer.     We discussed that there are multiple genes associated with increased breast and/or ovarian cancer risk. Some genes, like the BRCA genes are considered highly penetrant breast and ovarian cancer genes, meaning a mutation in the gene confers a high risk of breast and/or ovarian cancer. On the other hand, there are other intermediately penetrant (\"moderate risk\") breast and ovarian cancer genes. For many of the moderate risk genes, there is sometimes limited information regarding the degree to which a mutation in the gene affects risk of different types of cancers. Additionally, for some of these moderate risk genes, the appropriate management for individuals who have a mutation is not always clear. In many cases, if an individual tests positive for a mutation in a moderate risk gene, recommendations are based on family history.    We discussed hereditary causes of " colon cancer, the most common of which is Prado syndrome, an autosomal dominant condition caused by germline mutations in one of five genes - MLH1, MSH2, MSH6, PMS2, and EPCAM. Mutation carriers have an increased risk to develop colorectal cancer over their lifetime, as well as an increased risk for a second primary colon cancer. In addition to the risk for colon cancer, women with Prado syndrome also have an increased risk for endometrial (uterine) cancer and ovarian cancer. Other cancers associated with Prado syndrome include gastric cancer, hepatobiliary, small bowel, urinary tract, and rarely pancreatic cancer. For individuals with Prado syndrome, there are medical management options, such as more frequent colonoscopies, that can be considered to help manage the risk for cancer.     Ms. Nazario was counseled about hereditary cancer susceptibility including cancer risks, options for increased screening and/or risk reduction, genetic testing, and the implications for other family members.  We discussed performing testing for high-penetrance breast cancer susceptibility genes, ideally as part of a multi-gene panel.  We specifically discussed the [employer] CancerNext panel, which examines the following 39 genes:  APC, CALLIE, AXIN2, BAP1, BARD1, BMPR1A, BRCA1, BRCA2, BRIP1, CDH1, CDKN2A, CHEK2, EPCAM, FH, FLCN, GREM1, HOXB13, MBD4, MET, MLH1, MSH2, MSH3, MSH6, MUTYH, NF1, NTHL1, PALB2, PMS2, POLD1, POLE, PTEN, RAD51C, RAD51D, SMAD4, STK11, TP53, TSC1, TSC2, VHL.    We discussed the Genetic Information Nondiscrimination Act (FELIPE), including the protections and limitations. FELIPE is a federal law that generally makes it illegal for health insurance companies, group health plans, and most employers (with >15 employees) to discriminate based on genetic information. It does not protect against genetic discrimination for life insurance, disability insurance, long-term care, or other insurances.    We reviewed that it is best,  if possible, to test a cancer-affected individual when trying to determine the cause of the cancer in the family.  We discussed that if this is not feasible, for any reason, then it is very appropriate to test cancer-unaffected family members.  Her sister, who had breast cancer, was an informative relative to test, and has had negative genetic testing. Other informative relatives for testing include her mother, who has a history of breast cancer, and her maternal aunt, who has a history of colon cancer.  If testing for these relatives is not feasible at this time, we discussed that testing for Ms. Nazario would certainly be appropriate. After further discussion, Ms. Nazario is uncertain whether her mother and maternal aunt would be amenable to genetic testing. Given this, she would like to move forward with her own genetic testing.     We reviewed the limitations of testing an unaffected individual for a hereditary cancer syndrome. When testing an individual who has not had a cancer diagnosis, like Ms. Nazario, a negative test result may have limited utility in defining future cancer risks, as it is unclear whether the affected family members had a mutation that the unaffected individual did not inherit, or alternatively, whether the family history of cancer is due to unidentifiable risk factors that are shared between the unaffected individual and their family members.     After a discussion about the risks, benefits, and limitations of genetic testing,  Ms. Nazario elected to undergo genetic testing for hereditary cancer using the [employer] panel described above.  Verbal consent for testing was obtained.  A test kit will be sent to her home, and she plans to have her blood drawn for genetic testing in the near future. Results are typically available within 3-4 weeks, and Ms. Nazario will return to the Cancer Genetics Clinic (via in-person or virtual visit) to discuss her testing results.  At that time, we will  discuss the implications for both Ms. Nazario and her family members.    Plan:  1. Ms. Nazario elected to undergo genetic testing for hereditary cancer using the [employer] panel described above (CancerNext +RNAinsight). Verbal consent for testing was obtained. An [employer] DNA/RNA blood test kit will be sent to the patient's home. She will then bring the test kit to a  outpatient blood draw lab to have her blood drawn for testing (using the test tubes provided in the kit). The sample will then be sent to [facility] for analysis. Results are typically available in 3-4 weeks.    2.  Ms. Nazario will return for a follow up visit to discuss her test results.  A follow up appointment will be scheduled for 12/30/24 at 11:30am, virtual.    3. We remain available to Ms. Nazario at [phone] if any questions arise regarding information discussed at today's visit.    Jessie Husain MGC, [facility] [place]  Licensed Genetic Counselor  [facility]  Ph: [phone]    Reviewed by:  Cris Aranda MD  Clinical   [facility]    Time spent with patient: 70 minutes, virtual

## 2024-11-24 PROBLEM — Z80.3 FAMILY HISTORY OF BREAST CANCER: Status: ACTIVE | Noted: 2024-11-24

## 2024-11-24 PROBLEM — Z80.0 FAMILY HISTORY OF COLON CANCER: Status: ACTIVE | Noted: 2024-11-24

## 2024-12-06 ENCOUNTER — LAB (OUTPATIENT)
Dept: LAB | Facility: LAB | Age: 44
End: 2024-12-06
Payer: COMMERCIAL

## 2024-12-06 DIAGNOSIS — Z80.3 FAMILY HISTORY OF BREAST CANCER: ICD-10-CM

## 2024-12-06 DIAGNOSIS — Z80.0 FAMILY HISTORY OF COLON CANCER: ICD-10-CM

## 2024-12-28 LAB
COMMENTS - MP RESULT TYPE: NORMAL
SCAN RESULT: NORMAL

## 2024-12-30 ENCOUNTER — TELEMEDICINE CLINICAL SUPPORT (OUTPATIENT)
Dept: GENETICS | Facility: HOSPITAL | Age: 44
End: 2024-12-30
Payer: COMMERCIAL

## 2024-12-30 DIAGNOSIS — Z80.3 FAMILY HISTORY OF BREAST CANCER: ICD-10-CM

## 2024-12-30 DIAGNOSIS — Z71.83 ENCOUNTER FOR NONPROCREATIVE GENETIC COUNSELING: Primary | ICD-10-CM

## 2024-12-30 DIAGNOSIS — Z80.0 FAMILY HISTORY OF COLON CANCER: ICD-10-CM

## 2024-12-30 PROCEDURE — 96040 HC GENETIC COUNSELING, EACH 30 MIN: CPT | Mod: 95 | Performed by: GENETIC COUNSELOR, MS

## 2024-12-30 PROCEDURE — 96040 PR MEDICAL GENETICS COUNSELING EACH 30 MINUTES: CPT | Performed by: GENETIC COUNSELOR, MS

## 2024-12-30 NOTE — PROGRESS NOTES
A virtual (video and audio) visit between the patient (at the originating site) and provider (at the distant site) was utilized to provide this telehealth service. Verbal consent was requested and obtained from Mar Nazario on this date, 2024 for a telehealth visit.  Patient confirmed they were located in the [facility] at the time of the appointment.      Genetic counseling follow-up visit:  Ms. Mar Nazario is a 44 y.o. female with a family history of breast and colon cancer. She initially presented to the Cancer Genetics Clinic via telehealth consult on 2024 for counseling and coordination of testing. Following that appointment, a blood sample was sent for genetic testing through [facility] via the [email or web address] panel +Locately, which analyzed 39 genes associated with hereditary cancer. The results of this testing have returned. Ms. Nazario presented for a follow up virtual visit to review her results, and our discussion is summarized below.  She was able to access her test report through her [facility].     Family history:  A 4-generation pedigree was previously obtained and was significant for the following:   - Sister, breast cancer at 39 or 40, negative genetic testing through [facility] in , (70-gene Multi-Cancer panel, results confirmed)  - Mother, breast cancer at 63  - Maternal aunt, colon cancer at 19, hemangioma behind the eye, tumor of the left nasal cavity  - Maternal aunt, cancer NOS  - Maternal grandmother, colon cancer,  of a stroke in her 40s/50s  - Two maternal great aunts (MGM's side) had colon cancer at uncertain ages,   - Maternal great grandmother (MGM's side) with possible cancer history,   - Maternal great grandfather (MGM's side) with colon cancer at uncertain age,   - Two maternal great aunts (MGF's side) had breast cancer at uncertain ages,      Limited information about her paternal family history's medical history, but no  known cancer.       Maternal and paternal ancestry is Sammarinese.  There is no known Ashkenazi Confucianist ancestry or consanguinity.      Test Results:  Please refer to the genetic test report from [facility] scanned under Media, attached to this encounter. Results are summarized here.     Test Performed: CancerNext +[employer]     Results: NEGATIVE    No pathogenic mutations, variants of unknown significance, or gross deletions or duplications were detected.    Genes Analyzed (39 total): APC, CALLIE, BAP1, BARD1, BMPR1A, BRCA1, BRCA2, BRIP1, CDH1, CDKN2A, CHEK2, FH, FLCN, MET, MLH1, MSH2, MSH6, MUTYH, NF1, NTHL1, PALB2, PMS2, PTEN, RAD51C, RAD51D, SMAD4, STK11, TP53, TSC1, TSC2 and VHL (sequencing and deletion/duplication); AXIN2, HOXB13, MBD4, MSH3, POLD1 and POLE (sequencing only); EPCAM and GREM1 (deletion/duplication only). RNA data is routinely analyzed for use in variant interpretation for all genes.    Discussion:  Ms. Nazario's genetic test results were NEGATIVE, meaning that no clinically significant variants were identified in the tested genes. These results do not explain the family history of breast cancer or colon cancer.     Ms. Nazario's results could be negative for several possible reasons:   - The cancer in the family might not be due to a single-gene cause. The vast majority of cancer is sporadic or familial.   - There could be a mutation in the family that the patient did not inherit.  - There could be a mutation in one of the tested genes that cannot be found with current testing methods.  - There could be a mutation in a gene that has not yet been linked to hereditary cancer or was not tested.    While it is reassuring that no mutations were detected in the tested genes, Ms. Nazario remains at an increased risk for developing breast cancer. Her risk for breast cancer was calculated using the Tyrer Cuzick risk model (based on age, personal risk factors, and family history).  Her lifetime risk  for breast cancer is estimated to be 22.6% (versus 10% for the general population). Her risk to develop breast cancer over the next 10 years is estimated to be 5.4% (versus 10-year population risk of 2.3%).  Importantly, available models can only estimate risk and cannot account for all risk factors. Breast cancer risk is dynamic and can change with age and family history.      For women with a lifetime risk of breast cancer estimated to be >20%, the National Comprehensive Cancer Network (NCCN) guidelines include: breast awareness, clinical encounter every 6-12 months, annual mammogram, as well as annual breast MRI with and without contrast. In some cases, other options may be appropriate. Recommendations and options for management should be discussed with managing physicians.  She has an appt scheduled with the [facility] Breast Clinic at [facility] on 1/10/25, and she was encouraged to keep this appointment.     We also discussed that Ms. Nazario remains at increased risk for CRC based on her family history of colon cancer in multiple maternal relatives, including her maternal aunt, maternal grandmother, two maternal great aunts, and maternal great grandfather (two second-degree relatives and three third-degree relatives).  She is advised to review the family history with her PCP and gastroenterologist and discuss recommended frequency of screening colonoscopies.  She states she has reviewed this with her PCP, and colonoscopy every 5 years was recommended. She reports that she is scheduled for her next colonoscopy on 1/24/25.      With negative test results and no family history of ovarian or uterine cancer, Ms. Nazario is not considered to have an increased risk for these cancer types from a genetic standpoint. Prophylactic surgery is not indicated from a genetic standpoint. She should continue regular gynecology exams as recommended by her physicians.     She should otherwise continue to follow her primary  care provider's recommendations for all other age-related cancer screenings.    We discussed that Ms. Nazario's mother and other maternal relatives are still candidates for their own genetic testing, as the family history of breast cancer and colon cancer remains unexplained. In particular, her mother (who had breast cancer) and maternal aunt (who had early-onset colon cancer) would be informative relatives for genetic testing. There may be a gene mutation in the family that Ms. Nazario did not inherit. If interested, Ms. Nazario's relatives may contact our Cancer Genetics Clinic by calling [phone]. They may also visit [email or web address] to find a genetic counselor in their area.     We encourage Ms. Nazario to let us know if other relatives pursue genetic testing and/or if she is able to obtain genetic test results for any maternal relatives, as this information could impact our understanding of cancer risk for Ms. Nazario and other relatives.    Our understanding of genetic contribution to cancer risk is always evolving, so there may be additional testing recommended in the future. Ms. Nazario is encouraged to contact us in 3-5 years to determine if there have been any changes since our discussion today. Additionally, we encourage Ms. Nazario to keep us updated if there are any changes to her personal or family history of cancer.       Jessie Husain, MGC, [facility] [place]  Licensed Genetic Counselor  [facility]  Ph: [phone]    Reviewed by:  Cris Aranda MD  Clinical   [facility]    Time spent with patient: 20 minutes, virtual

## 2025-01-24 ENCOUNTER — APPOINTMENT (OUTPATIENT)
Dept: GASTROENTEROLOGY | Facility: EXTERNAL LOCATION | Age: 45
End: 2025-01-24
Payer: COMMERCIAL

## 2025-01-24 DIAGNOSIS — D12.5 BENIGN NEOPLASM OF SIGMOID COLON: ICD-10-CM

## 2025-01-24 DIAGNOSIS — Z12.11 COLON CANCER SCREENING: ICD-10-CM

## 2025-01-24 DIAGNOSIS — K63.3 ULCER OF INTESTINE: ICD-10-CM

## 2025-01-24 DIAGNOSIS — Z80.0 FAMILY HISTORY OF MALIGNANT NEOPLASM OF GASTROINTESTINAL TRACT: Primary | ICD-10-CM

## 2025-01-24 PROCEDURE — 45380 COLONOSCOPY AND BIOPSY: CPT | Performed by: INTERNAL MEDICINE

## 2025-01-24 PROCEDURE — 88305 TISSUE EXAM BY PATHOLOGIST: CPT | Performed by: STUDENT IN AN ORGANIZED HEALTH CARE EDUCATION/TRAINING PROGRAM

## 2025-01-24 PROCEDURE — 45385 COLONOSCOPY W/LESION REMOVAL: CPT | Performed by: INTERNAL MEDICINE

## 2025-01-24 PROCEDURE — 88305 TISSUE EXAM BY PATHOLOGIST: CPT

## 2025-01-27 ENCOUNTER — LAB REQUISITION (OUTPATIENT)
Dept: LAB | Facility: HOSPITAL | Age: 45
End: 2025-01-27
Payer: COMMERCIAL

## 2025-01-27 DIAGNOSIS — Z12.11 ENCOUNTER FOR SCREENING FOR MALIGNANT NEOPLASM OF COLON: ICD-10-CM

## 2025-02-04 ENCOUNTER — OFFICE VISIT (OUTPATIENT)
Dept: URGENT CARE | Age: 45
End: 2025-02-04
Payer: COMMERCIAL

## 2025-02-04 VITALS
RESPIRATION RATE: 16 BRPM | DIASTOLIC BLOOD PRESSURE: 88 MMHG | OXYGEN SATURATION: 98 % | SYSTOLIC BLOOD PRESSURE: 138 MMHG | HEART RATE: 98 BPM | TEMPERATURE: 98.6 F

## 2025-02-04 DIAGNOSIS — U07.1 COVID: Primary | ICD-10-CM

## 2025-02-04 DIAGNOSIS — H69.92 ACUTE DYSFUNCTION OF LEFT EUSTACHIAN TUBE: ICD-10-CM

## 2025-02-04 DIAGNOSIS — R68.89 FLU-LIKE SYMPTOMS: ICD-10-CM

## 2025-02-04 DIAGNOSIS — Z20.822 SUSPECTED COVID-19 VIRUS INFECTION: ICD-10-CM

## 2025-02-04 LAB
POC RAPID INFLUENZA A: NEGATIVE
POC RAPID INFLUENZA B: NEGATIVE
POC SARS-COV-2 AG BINAX: ABNORMAL

## 2025-02-04 ASSESSMENT — ENCOUNTER SYMPTOMS
SINUS PAIN: 1
RHINORRHEA: 1
SORE THROAT: 1
FEVER: 1
COUGH: 1
CHILLS: 1
SINUS PRESSURE: 1
SHORTNESS OF BREATH: 0
WHEEZING: 0
CHEST TIGHTNESS: 1

## 2025-02-04 NOTE — PATIENT INSTRUCTIONS
As discussed there is no ear infection. Your symptoms appear consistent with eustachian tube dysfunction.   To help the eustachian tubes open and drain, I recommend use of:  -- Antihistamines like Claritin or Zyrtec  -- Flonase to reduce nasal congestion  -- Decongestants like Sudafed (30-60mg) to dry out fluid    Follow up with your PCP if:  -- Symptoms not improved after 5-7 days  -- If you have any worsening or new symptoms

## 2025-02-04 NOTE — LETTER
February 4, 2025     Patient: Mar Nazario   YOB: 1980   Date of Visit: 2/4/2025       To Whom It May Concern:    Mar Nazario was seen in my clinic on 2/4/2025. Please excuse Mar for her absence from work on this day to make the appointment. Can return 2/6/25    If you have any questions or concerns, please don't hesitate to call.         Sincerely,         Shelbi Stearns PA-C        CC: No Recipients

## 2025-02-04 NOTE — PROGRESS NOTES
Subjective   Patient ID: Mar Nazario is a 44 y.o. female. They present today with a chief complaint of Sinus Problem (Cough, sinus complaint, BL ear pain since saturday).    History of Present Illness  Patient presents for illness symptoms that started on Saturday. She reports that she has had nasal congestion, post nasal drainage, runny nose, sinus pressure, bilateral ear pain and pressure, cough that is occasionally productive, sore throat, feeling hot and chills with body aches. She reports some chest tightness. She did take Mucinex and Flonase with some relief though notes it did not last long period she reports she just flew home from [place] yesterday. She denies any history of asthma or COPD.          Past Medical History  Allergies as of 02/04/2025 - Reviewed 02/04/2025   Allergen Reaction Noted    Oxycodone-acetaminophen Other 12/03/2008    Codeine Other and Palpitations 11/15/2011       (Not in a hospital admission)       Past Medical History:   Diagnosis Date    ADHD (attention deficit hyperactivity disorder) 1 year ago    Anxiety 1 year ago    Arthritis     Bursitis of hip 2018    GERD (gastroesophageal reflux disease)     Low back pain 2018    Personal history of other benign neoplasm     History of uterine leiomyoma    Personal history of other diseases of the nervous system and sense organs     History of migraine    Plantar fasciitis 2006    Primary osteoarthritis of left knee 08/15/2023    Sciatica 2018    Trigger finger 2024       Past Surgical History:   Procedure Laterality Date    JOINT REPLACEMENT  11/16/23 left knee replaced    KNEE ARTHROPLASTY  2023    KNEE SURGERY  04/07/2014    Knee Surgery    OTHER SURGICAL HISTORY  04/07/2014    Uterine Fibroid Embolization        reports that she has never smoked. She has never used smokeless tobacco. She reports that she does not currently use alcohol. She reports that she does not use drugs.    Review of Systems  Review of Systems    Constitutional:  Positive for chills and fever.   HENT:  Positive for congestion, ear pain, rhinorrhea, sinus pressure, sinus pain and sore throat. Negative for ear discharge.    Respiratory:  Positive for cough and chest tightness. Negative for shortness of breath and wheezing.                                   Objective    Vitals:    02/04/25 0812   BP: 138/88   Pulse: 98   Resp: 16   Temp: 37 °C (98.6 °F)   SpO2: 98%     No LMP recorded.    Physical Exam  Vitals reviewed.   Constitutional:       General: She is not in acute distress.     Appearance: Normal appearance. She is ill-appearing.   HENT:      Right Ear: Tympanic membrane, ear canal and external ear normal.      Left Ear: Ear canal and external ear normal. Tympanic membrane is bulging. Tympanic membrane is not injected.      Nose: Congestion and rhinorrhea present.      Mouth/Throat:      Pharynx: No pharyngeal swelling, oropharyngeal exudate or posterior oropharyngeal erythema.      Tonsils: No tonsillar exudate or tonsillar abscesses.   Cardiovascular:      Rate and Rhythm: Normal rate and regular rhythm.      Pulses: Normal pulses.      Heart sounds: Normal heart sounds.   Pulmonary:      Effort: Pulmonary effort is normal. No respiratory distress.      Breath sounds: Normal breath sounds. No wheezing, rhonchi or rales.   Lymphadenopathy:      Cervical: Cervical adenopathy present.   Neurological:      Mental Status: She is alert and oriented to person, place, and time.         Procedures    Point of Care Test & Imaging Results from this visit  Results for orders placed or performed in visit on 02/04/25   POCT Influenza A/B manually resulted   Result Value Ref Range    POC Rapid Influenza A Negative Negative    POC Rapid Influenza B Negative Negative   POCT Covid-19 Rapid Antigen   Result Value Ref Range    POC KATH-COV-2 AG Positive test for SARS-CoV-2 (antigen detected) (A) Presumptive negative test for SARS-CoV-2 (no antigen detected)      No  results found.    Diagnostic study results (if any) were reviewed by Shelbi Stearns PA-C.    Assessment/Plan   Allergies, medications, history, and pertinent labs/EKGs/Imaging reviewed by Shelbi Stearns PA-C.     Medical Decision Making  MDM: History and examination consistent with viral COVID and left eustachian tube dysfunction. Discussed there are no signs of pneumonia, sinusitis, otitis or other bacterial etiology. Plan at this time is supportive measures and symptomatic care at home. Advised to go to ER if worsens, otherwise follow with pcp. Patient verbalized understanding and agrees with plan.    To help the eustachian tubes open and drain, I recommend use of:  -- Antihistamines like Claritin or Zyrtec  -- Flonase to reduce nasal congestion  -- Decongestants like sudafed to dry out fluid    Follow up with your PCP if:  -- Symptoms not improved after 5-7 days  -- If you have any worsening or new symptoms     Orders and Diagnoses  Diagnoses and all orders for this visit:  COVID  Acute dysfunction of left eustachian tube  Flu-like symptoms  -     POCT Influenza A/B manually resulted  Suspected COVID-19 virus infection  -     POCT Covid-19 Rapid Antigen      Medical Admin Record      Patient disposition: Home    Electronically signed by Shelbi Stearns PA-C  8:44 AM

## 2025-02-06 LAB
LABORATORY COMMENT REPORT: NORMAL
PATH REPORT.FINAL DX SPEC: NORMAL
PATH REPORT.GROSS SPEC: NORMAL
PATH REPORT.RELEVANT HX SPEC: NORMAL
PATH REPORT.TOTAL CANCER: NORMAL

## 2025-06-19 DIAGNOSIS — K21.9 GASTROESOPHAGEAL REFLUX DISEASE, UNSPECIFIED WHETHER ESOPHAGITIS PRESENT: ICD-10-CM

## 2025-06-19 RX ORDER — OMEPRAZOLE 20 MG/1
20 CAPSULE, DELAYED RELEASE ORAL DAILY
Qty: 90 CAPSULE | Refills: 3 | Status: SHIPPED | OUTPATIENT
Start: 2025-06-19

## 2025-07-07 ASSESSMENT — PROMIS GLOBAL HEALTH SCALE
EMOTIONAL_PROBLEMS: SOMETIMES
RATE_GENERAL_HEALTH: GOOD
RATE_MENTAL_HEALTH: GOOD
CARRYOUT_SOCIAL_ACTIVITIES: GOOD
RATE_SOCIAL_SATISFACTION: GOOD
RATE_AVERAGE_PAIN: 3
CARRYOUT_PHYSICAL_ACTIVITIES: COMPLETELY
RATE_QUALITY_OF_LIFE: VERY GOOD
RATE_AVERAGE_FATIGUE: MODERATE
RATE_PHYSICAL_HEALTH: GOOD

## 2025-07-10 ENCOUNTER — APPOINTMENT (OUTPATIENT)
Dept: PRIMARY CARE | Facility: CLINIC | Age: 45
End: 2025-07-10
Payer: COMMERCIAL

## 2025-07-10 NOTE — PROGRESS NOTES
"Subjective    Mar Nazario is a 45 y.o. female who presents for Annual Exam.  HPI    Mammogram 9/2024 she [facility] due to her family hx.   She had MRI  and it was good.  Colonoscopy -1/25 repeat in 5 years due to family hx of colon cancer  Pap  [facility] [place]    Tdap 2025  She has been doing ok. She is seeing.   She has a lot of joint pain she has been seeing a function   She hikes and lifts three times a week.   She eats healthy.         Review of Systems   All other systems reviewed and are negative.        Objective     /86   Pulse 70   Temp 36.7 °C (98 °F) (Skin)   Resp 14   Ht 1.575 m (5' 2\")   Wt 97.9 kg (215 lb 12.8 oz)   SpO2 98%   BMI 39.47 kg/m²    Physical Exam  Constitutional:       Appearance: Normal appearance.   HENT:      Mouth/Throat:      Mouth: Mucous membranes are moist.   Neck:      Thyroid: No thyroid mass or thyromegaly.   Cardiovascular:      Rate and Rhythm: Normal rate and regular rhythm.      Pulses: Normal pulses.   Pulmonary:      Effort: Pulmonary effort is normal.      Breath sounds: Normal breath sounds.   Chest:      Chest wall: No mass or tenderness.   Breasts:     Right: Normal.      Left: Normal.   Abdominal:      General: Abdomen is flat.      Palpations: Abdomen is soft.      Tenderness: There is no abdominal tenderness.   Musculoskeletal:      Cervical back: Neck supple. No tenderness.   Lymphadenopathy:      Cervical: No cervical adenopathy.      Upper Body:      Right upper body: No supraclavicular or axillary adenopathy.      Left upper body: No supraclavicular or axillary adenopathy.   Skin:     General: Skin is warm.   Neurological:      General: No focal deficit present.      Mental Status: She is alert.   Psychiatric:         Attention and Perception: Attention and perception normal.         Mood and Affect: Mood and affect normal.       Health Maintenance Due   Topic Date Due    Lipid " Panel  Never done    HIV Screening  Never done    MMR Vaccines (1 of 1 - Standard series) Never done    Hepatitis C Screening  Never done    Hepatitis B Vaccines (1 of 3 - 19+ 3-dose series) Never done    Pneumococcal Vaccine: Pediatrics and At-Risk Adult Patients (1 of 2 - PCV) Never done    COVID-19 Vaccine (3 - 2024-25 season) 09/01/2024    Diabetes Screening  07/09/2025    Mammogram  09/09/2025          Assessment/Plan   Problem List Items Addressed This Visit       Arthralgia    Iron disorder    Relevant Orders    Iron and TIBC    Positive KATI (antinuclear antibody)    Vitamin D deficiency     Other Visit Diagnoses         Wellness examination    -  Primary    Relevant Orders    CBC    Comprehensive Metabolic Panel    Lipid Panel      Mixed hyperlipidemia          Class 2 severe obesity due to excess calories with serious comorbidity and body mass index (BMI) of 39.0 to 39.9 in adult            Recommend Mediterranean type diet with healthy protein, vegetables, and healthy fats being main source of fuel. Sugars and highly processed carbs sparingly  Recommend exercise program that includes both cardio and weight training to maintain healthy lifestyle  Check labs.   Call  with any problems or questions.   Follow up in a year or sooner if needed

## 2025-07-14 ENCOUNTER — APPOINTMENT (OUTPATIENT)
Dept: PRIMARY CARE | Facility: CLINIC | Age: 45
End: 2025-07-14
Payer: COMMERCIAL

## 2025-07-14 VITALS
OXYGEN SATURATION: 98 % | HEART RATE: 70 BPM | SYSTOLIC BLOOD PRESSURE: 120 MMHG | TEMPERATURE: 98 F | WEIGHT: 215.8 LBS | DIASTOLIC BLOOD PRESSURE: 86 MMHG | HEIGHT: 62 IN | BODY MASS INDEX: 39.71 KG/M2 | RESPIRATION RATE: 14 BRPM

## 2025-07-14 DIAGNOSIS — E78.2 MIXED HYPERLIPIDEMIA: ICD-10-CM

## 2025-07-14 DIAGNOSIS — E66.812 CLASS 2 SEVERE OBESITY DUE TO EXCESS CALORIES WITH SERIOUS COMORBIDITY AND BODY MASS INDEX (BMI) OF 39.0 TO 39.9 IN ADULT: ICD-10-CM

## 2025-07-14 DIAGNOSIS — E83.10 IRON DISORDER: ICD-10-CM

## 2025-07-14 DIAGNOSIS — E66.01 CLASS 2 SEVERE OBESITY DUE TO EXCESS CALORIES WITH SERIOUS COMORBIDITY AND BODY MASS INDEX (BMI) OF 39.0 TO 39.9 IN ADULT: ICD-10-CM

## 2025-07-14 DIAGNOSIS — Z00.00 WELLNESS EXAMINATION: Primary | ICD-10-CM

## 2025-07-14 DIAGNOSIS — M25.50 ARTHRALGIA, UNSPECIFIED JOINT: ICD-10-CM

## 2025-07-14 DIAGNOSIS — R76.8 POSITIVE ANA (ANTINUCLEAR ANTIBODY): ICD-10-CM

## 2025-07-14 DIAGNOSIS — E55.9 VITAMIN D DEFICIENCY: ICD-10-CM

## 2025-07-14 PROCEDURE — 1036F TOBACCO NON-USER: CPT | Performed by: INTERNAL MEDICINE

## 2025-07-14 PROCEDURE — 3008F BODY MASS INDEX DOCD: CPT | Performed by: INTERNAL MEDICINE

## 2025-07-14 PROCEDURE — 99396 PREV VISIT EST AGE 40-64: CPT | Performed by: INTERNAL MEDICINE

## 2025-07-14 ASSESSMENT — PATIENT HEALTH QUESTIONNAIRE - PHQ9
1. LITTLE INTEREST OR PLEASURE IN DOING THINGS: NOT AT ALL
2. FEELING DOWN, DEPRESSED OR HOPELESS: NOT AT ALL
SUM OF ALL RESPONSES TO PHQ9 QUESTIONS 1 AND 2: 0

## 2025-07-17 LAB
ALBUMIN SERPL-MCNC: 4.3 G/DL (ref 3.6–5.1)
ALP SERPL-CCNC: 51 U/L (ref 31–125)
ALT SERPL-CCNC: 15 U/L (ref 6–29)
ANION GAP SERPL CALCULATED.4IONS-SCNC: 9 MMOL/L (CALC) (ref 7–17)
AST SERPL-CCNC: 19 U/L (ref 10–35)
BILIRUB SERPL-MCNC: 0.6 MG/DL (ref 0.2–1.2)
BUN SERPL-MCNC: 16 MG/DL (ref 7–25)
CALCIUM SERPL-MCNC: 9.1 MG/DL (ref 8.6–10.2)
CHLORIDE SERPL-SCNC: 106 MMOL/L (ref 98–110)
CHOLEST SERPL-MCNC: 184 MG/DL
CHOLEST/HDLC SERPL: 3.2 (CALC)
CO2 SERPL-SCNC: 24 MMOL/L (ref 20–32)
CREAT SERPL-MCNC: 1.02 MG/DL (ref 0.5–0.99)
EGFRCR SERPLBLD CKD-EPI 2021: 68 ML/MIN/1.73M2
ERYTHROCYTE [DISTWIDTH] IN BLOOD BY AUTOMATED COUNT: 13.4 % (ref 11–15)
GLUCOSE SERPL-MCNC: 114 MG/DL (ref 65–139)
HCT VFR BLD AUTO: 42.9 % (ref 35–45)
HDLC SERPL-MCNC: 57 MG/DL
HGB BLD-MCNC: 13.7 G/DL (ref 11.7–15.5)
IRON SATN MFR SERPL: 21 % (CALC) (ref 16–45)
IRON SERPL-MCNC: 94 MCG/DL (ref 40–190)
LDLC SERPL CALC-MCNC: 109 MG/DL (CALC)
MCH RBC QN AUTO: 30.7 PG (ref 27–33)
MCHC RBC AUTO-ENTMCNC: 31.9 G/DL (ref 32–36)
MCV RBC AUTO: 96.2 FL (ref 80–100)
NONHDLC SERPL-MCNC: 127 MG/DL (CALC)
PLATELET # BLD AUTO: 227 THOUSAND/UL (ref 140–400)
PMV BLD REES-ECKER: 11.3 FL (ref 7.5–12.5)
POTASSIUM SERPL-SCNC: 4 MMOL/L (ref 3.5–5.3)
PROT SERPL-MCNC: 6.9 G/DL (ref 6.1–8.1)
RBC # BLD AUTO: 4.46 MILLION/UL (ref 3.8–5.1)
SODIUM SERPL-SCNC: 139 MMOL/L (ref 135–146)
TIBC SERPL-MCNC: 438 MCG/DL (CALC) (ref 250–450)
TRIGL SERPL-MCNC: 85 MG/DL
WBC # BLD AUTO: 7.8 THOUSAND/UL (ref 3.8–10.8)

## 2026-07-17 ENCOUNTER — APPOINTMENT (OUTPATIENT)
Dept: PRIMARY CARE | Facility: CLINIC | Age: 46
End: 2026-07-17
Payer: COMMERCIAL